# Patient Record
Sex: FEMALE | Race: WHITE | Employment: OTHER | URBAN - METROPOLITAN AREA
[De-identification: names, ages, dates, MRNs, and addresses within clinical notes are randomized per-mention and may not be internally consistent; named-entity substitution may affect disease eponyms.]

---

## 2017-02-16 ENCOUNTER — GENERIC CONVERSION - ENCOUNTER (OUTPATIENT)
Dept: OTHER | Facility: OTHER | Age: 77
End: 2017-02-16

## 2017-10-12 ENCOUNTER — ALLSCRIPTS OFFICE VISIT (OUTPATIENT)
Dept: OTHER | Facility: OTHER | Age: 77
End: 2017-10-12

## 2017-10-12 DIAGNOSIS — N32.81 OVERACTIVE BLADDER: ICD-10-CM

## 2017-10-25 ENCOUNTER — GENERIC CONVERSION - ENCOUNTER (OUTPATIENT)
Dept: OTHER | Facility: OTHER | Age: 77
End: 2017-10-25

## 2017-10-26 ENCOUNTER — ALLSCRIPTS OFFICE VISIT (OUTPATIENT)
Dept: OTHER | Facility: OTHER | Age: 77
End: 2017-10-26

## 2017-10-26 LAB
BILIRUB UR QL STRIP: NEGATIVE
CLARITY UR: NORMAL
COLOR UR: YELLOW
GLUCOSE (HISTORICAL): NEGATIVE
HGB UR QL STRIP.AUTO: NEGATIVE
KETONES UR STRIP-MCNC: NEGATIVE MG/DL
LEUKOCYTE ESTERASE UR QL STRIP: NEGATIVE
NITRITE UR QL STRIP: NEGATIVE
PH UR STRIP.AUTO: 6 [PH]
PROT UR STRIP-MCNC: NEGATIVE MG/DL
SP GR UR STRIP.AUTO: 1.01
UROBILINOGEN UR QL STRIP.AUTO: 0.2

## 2018-01-09 NOTE — MISCELLANEOUS
Message   Recorded as Task   Date: 07/14/2016 01:14 PM, Created By: Venancio Chavez   Task Name: Follow Up   Assigned To: Dasha Gomes   Regarding Patient: Da Sutton, Status: Active   Comment:    Dasha Gomes - 14 Jul 2016 1:14 PM     TASK CREATED  pt said that she got the vinegar douche    she wants to know how often she can do that? I thought that it was just now and that was it? 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 3) (N95 2)   6  Vaginal odor (625 8) (N89 8)   7  Visit for screening mammogram (V76 12) (Z12 31)    Current Meds   1  Citalopram Hydrobromide 20 MG Oral Tablet; Therapy: 18ARV4823 to (Last Rx:43Jty2123)  Requested for: 37Nqw4726 Ordered   2  ClonazePAM 0 5 MG Oral Tablet; Therapy: 96LKY2282 to (Last Rx:38Kya0846)  Requested for: 61Chc9627 Ordered   3  Nasacort AQ 55 MCG/ACT AERS; Therapy: 38Ewm7853 to (Last Danetta Offer)  Requested for: 49Aey2317 Ordered   4  NexIUM PACK; Therapy: (Recorded:27Jun2014) to Recorded   5  Synthroid 125 MCG Oral Tablet (Levothyroxine Sodium); Therapy: (Recorded:27Jun2014) to Recorded    Allergies    1  Easprin TBEC   2   Penicillins    Signatures   Electronically signed by : Jean eFrreira, ; Jul 14 2016  1:34PM EST                       (Author)

## 2018-01-12 VITALS
WEIGHT: 177.25 LBS | SYSTOLIC BLOOD PRESSURE: 120 MMHG | BODY MASS INDEX: 29.53 KG/M2 | DIASTOLIC BLOOD PRESSURE: 62 MMHG | HEIGHT: 65 IN

## 2018-01-15 NOTE — MISCELLANEOUS
Message   Date: 26 Jan 2016 10:40 AM EST, Recorded By: Ginna Smith   Calling For: Yamila Mchugh: Fred Sebastian, Self   Phone: [phone] (Home)   Reason: Other   Patient called for screening mammo slip  States did not have Dx reagan and u/s done in July 2015 as ordered by Dr Neida Ruth  States that problem has since cleared up  Now wants annual screening  Slip mailed per request         Active Problems    1  Breast disorder (611 9) (N64 9)   2  Encounter for routine gynecological examination (V72 31) (Z01 419)   3  Denied: History of self breast exam   4  Hypothyroidism (244 9) (E03 9)   5  Postmenopausal atrophic vaginitis (627 3) (N95 2)   6  Visit for screening mammogram (V76 12) (Z12 31)    Current Meds   1  Citalopram Hydrobromide 20 MG Oral Tablet; Therapy: 93PCH4766 to (Last Rx:17Gzm5795)  Requested for: 82Tpc0080 Ordered   2  ClonazePAM 0 5 MG Oral Tablet; Therapy: 45XML4983 to (Last Rx:05Dze9684)  Requested for: 17Vsr6126 Ordered   3  Nasacort AQ 55 MCG/ACT AERS; Therapy: 63Zyt2737 to (Last Susi Lass)  Requested for: 54Ywp5550 Ordered   4  NexIUM PACK; Therapy: (Recorded:27Jun2014) to Recorded   5  Synthroid 125 MCG Oral Tablet (Levothyroxine Sodium); Therapy: (Recorded:27Jun2014) to Recorded    Allergies    1  Easprin TBEC   2  Penicillins    Plan  Visit for screening mammogram    · * MAMMO SCREENING BILATERAL W CAD; Status:Hold For - Scheduling,Retrospective  Authorization;  Requested [id number];     Signatures   Electronically signed by : Chantelle Shen, ; Jan 26 2016 10:42AM EST                       (Author)

## 2018-01-16 NOTE — MISCELLANEOUS
Message   Recorded as Task   Date: 10/25/2017 09:00 AM, Created By: George Mathur   Task Name: Medical Complaint Callback   Assigned To: Freddy CRAWFORD,TEAM   Regarding Patient: Kristin Perez, Status: Active   CommentKeaton Smallwood - 25 Oct 2017 9:00 AM     TASK CREATED  Caller: Self; [phone] (Home)  Pt seen 10/22/17 Patient [facility]  kidney pain calling for appointment  Mandy Maria - 25 Oct 2017 9:15 AM     TASK EDITED  PT  STATED SHE GOTTEN IT TAKEN CARE OF ALREADY  Active Problems    1  Breast disorder (611 9) (N64 9)   2  Encounter for routine gynecological examination (V72 31) (Z01 419)   3  History of self breast exam   4  Hypothyroidism (244 9) (E03 9)   5  Overactive bladder (596 51) (N32 81)   6  Postmenopausal atrophic vaginitis (627 3) (N95 2)   7  Vaginal odor (625 8) (N89 8)   8  Visit for screening mammogram (V76 12) (Z12 31)    Current Meds   1  Citalopram Hydrobromide 20 MG Oral Tablet; Therapy: 57SCX6045 to (Last Rx:39Npl5690)  Requested for: 39Efo0229 Ordered   2  Nasacort AQ 55 MCG/ACT AERS; Therapy: 68Fgv1033 to (Last Nile Germán)  Requested for: 54Xzk8260 Ordered   3  NexIUM PACK; Therapy: (Recorded:27Jun2014) to Recorded   4  Synthroid 125 MCG Oral Tablet (Levothyroxine Sodium); Therapy: (Recorded:27Jun2014) to Recorded   5  VESIcare 5 MG Oral Tablet; TAKE 1 TABLET DAILY; Therapy: 68NTH1150 to (Evaluate:27Zlx7894)  Requested for: 37FBE4069; Last   Rx:12Oct2017 Ordered    Allergies    1  Easprin TBEC   2   Penicillins    Signatures   Electronically signed by : Enrrique Wells, ; Oct 25 2017  9:15AM EST                       (Author)

## 2018-01-17 NOTE — MISCELLANEOUS
Message   Recorded as Task   Date: 10/25/2017 11:35 AM, Created By: Wilmer Looney   Task Name: Medical Complaint Callback   Assigned To: [place] [facility] [place] [id number],TEAM   Regarding Patient: Kelsie Sutton, Status: Active   Comment:    Arlene Roe - 25 Oct 2017 11:35 AM     TASK CREATED  Caller: Self; Medical Complaint; [phone] (Home)  PATIENT CALLED SAYING SHE WAS WAITING FOR A NURSE TO CALL HER BACK ONCE SHE RECIEVED HER PAPERS FROM THE [facility] ON [address]  PLEASE CALL HER BACK AT HOME #   Rand Oliva - 25 Oct 2017 11:48 AM     TASK EDITED  SPOKE TO PT, SEEN AT URGENT CARE, CULTURE DONE AND PLACED ON ANTIBIOTIC, RECEIVED CALL TODAY CULTURE "OKAY" TO STOP ANTIBIOTIC  STILL HAVING RIGHT BACK PAIN AND URINE CLOUDY  WILL OBTAIN CULTURE FROM PT FIRST  APPT 10/26 WITH ANTONIA HENNING PA-C  Active Problems    1  Breast disorder (611 9) (N64 9)   2  Encounter for routine gynecological examination (V72 31) (Z01 419)   3  History of self breast exam   4  Hypothyroidism (244 9) (E03 9)   5  Overactive bladder (596 51) (N32 81)   6  Postmenopausal atrophic vaginitis (627 3) (N95 2)   7  Vaginal odor (625 8) (N89 8)   8  Visit for screening mammogram (V76 12) (Z12 31)    Current Meds   1  Citalopram Hydrobromide 20 MG Oral Tablet; Therapy: 21RSV2746 to (Last Rx:61Cbi5170)  Requested for: 64Eyf8462 Ordered   2  Nasacort AQ 55 MCG/ACT AERS; Therapy: 69Qaa9414 to (Last [place])  Requested for: 50Tka3261 Ordered   3  NexIUM PACK; Therapy: (Recorded:27Jun2014) to Recorded   4  Synthroid 125 MCG Oral Tablet (Levothyroxine Sodium); Therapy: (Recorded:27Jun2014) to Recorded   5  VESIcare 5 MG Oral Tablet; TAKE 1 TABLET DAILY; Therapy: 41IGT2207 to (Evaluate:10Jan2018)  Requested for: 27FBV1219; Last   Rx:12Oct2017 Ordered    Allergies    1  Easprin TBEC   2   Penicillins    Signatures   Electronically signed by : Vivian Fry RN; Oct 25 2017 11:48AM EST                       (Author)

## 2018-01-22 VITALS
DIASTOLIC BLOOD PRESSURE: 76 MMHG | SYSTOLIC BLOOD PRESSURE: 148 MMHG | WEIGHT: 174 LBS | BODY MASS INDEX: 27.31 KG/M2 | HEIGHT: 67 IN

## 2018-08-02 ENCOUNTER — TELEPHONE (OUTPATIENT)
Dept: OBGYN CLINIC | Facility: CLINIC | Age: 78
End: 2018-08-02

## 2018-08-02 DIAGNOSIS — Z12.31 SCREENING MAMMOGRAM, ENCOUNTER FOR: Primary | ICD-10-CM

## 2019-07-19 ENCOUNTER — TELEPHONE (OUTPATIENT)
Dept: OBGYN CLINIC | Facility: CLINIC | Age: 79
End: 2019-07-19

## 2019-07-19 NOTE — TELEPHONE ENCOUNTER
Patient called c/o spot on right breast seems darker than it was previously    Advised schedule appointment for breast exam

## 2019-07-23 ENCOUNTER — OFFICE VISIT (OUTPATIENT)
Dept: OBGYN CLINIC | Facility: CLINIC | Age: 79
End: 2019-07-23
Payer: MEDICARE

## 2019-07-23 VITALS
HEIGHT: 66 IN | BODY MASS INDEX: 26.2 KG/M2 | WEIGHT: 163 LBS | DIASTOLIC BLOOD PRESSURE: 70 MMHG | SYSTOLIC BLOOD PRESSURE: 128 MMHG

## 2019-07-23 DIAGNOSIS — L70.0 BLACKHEAD: Primary | ICD-10-CM

## 2019-07-23 PROCEDURE — 99213 OFFICE O/P EST LOW 20 MIN: CPT | Performed by: OBSTETRICS & GYNECOLOGY

## 2019-07-23 RX ORDER — CITALOPRAM 10 MG/1
10 TABLET ORAL DAILY
COMMUNITY
End: 2020-07-10

## 2019-07-23 RX ORDER — LEVOFLOXACIN 750 MG/1
TABLET ORAL
COMMUNITY
End: 2020-07-10

## 2019-07-23 RX ORDER — LEVOTHYROXINE SODIUM 175 UG/1
TABLET ORAL
COMMUNITY
End: 2020-07-10

## 2019-07-23 RX ORDER — METHOCARBAMOL 500 MG/1
TABLET, FILM COATED ORAL
COMMUNITY
End: 2020-07-10

## 2019-07-23 RX ORDER — CYCLOBENZAPRINE HCL 5 MG
TABLET ORAL
COMMUNITY
End: 2020-07-10

## 2019-07-23 RX ORDER — LEVOTHYROXINE SODIUM 0.15 MG/1
150 TABLET ORAL DAILY
Refills: 1 | COMMUNITY
Start: 2019-07-17 | End: 2020-07-10

## 2019-07-23 RX ORDER — MECLIZINE HYDROCHLORIDE 25 MG/1
TABLET ORAL
COMMUNITY
End: 2020-07-10

## 2019-07-23 RX ORDER — LORAZEPAM 0.5 MG/1
TABLET ORAL
COMMUNITY
End: 2020-07-10

## 2019-07-23 RX ORDER — ESCITALOPRAM OXALATE 20 MG/1
TABLET ORAL DAILY
COMMUNITY
End: 2020-07-10

## 2019-07-23 RX ORDER — BUTALBITAL, ACETAMINOPHEN AND CAFFEINE 300; 40; 50 MG/1; MG/1; MG/1
CAPSULE ORAL
COMMUNITY

## 2019-07-23 RX ORDER — CHLORHEXIDINE GLUCONATE 4 G/100ML
SOLUTION TOPICAL
COMMUNITY
End: 2020-07-10

## 2019-07-23 RX ORDER — MOXIFLOXACIN HYDROCHLORIDE 400 MG/1
TABLET ORAL
COMMUNITY
End: 2020-07-10

## 2019-07-23 RX ORDER — TRAMADOL HYDROCHLORIDE 100 MG/1
TABLET, EXTENDED RELEASE ORAL
COMMUNITY
End: 2020-07-10

## 2019-07-23 RX ORDER — DOXYCYCLINE HYCLATE 100 MG/1
CAPSULE ORAL
COMMUNITY
End: 2020-07-10

## 2019-07-23 RX ORDER — FAMCICLOVIR 500 MG/1
TABLET, FILM COATED ORAL
COMMUNITY
End: 2020-07-10

## 2019-07-23 RX ORDER — COLCHICINE 0.6 MG/1
TABLET ORAL
COMMUNITY
End: 2020-07-10

## 2019-07-23 RX ORDER — MELOXICAM 7.5 MG/1
TABLET ORAL
COMMUNITY
End: 2020-07-10

## 2019-07-23 RX ORDER — CYCLOSPORINE 0.5 MG/ML
EMULSION OPHTHALMIC
COMMUNITY
End: 2020-07-10

## 2019-07-23 RX ORDER — METHOCARBAMOL 750 MG/1
TABLET, FILM COATED ORAL
COMMUNITY
End: 2020-07-10

## 2019-07-23 RX ORDER — CELECOXIB 200 MG/1
CAPSULE ORAL
COMMUNITY
End: 2020-07-10

## 2019-07-23 RX ORDER — DIFLUPREDNATE 0.5 MG/ML
EMULSION OPHTHALMIC
COMMUNITY
End: 2020-07-10

## 2019-07-23 RX ORDER — LEVOTHYROXINE SODIUM 20 UG/ML
INJECTION, SOLUTION INTRAVENOUS
COMMUNITY
End: 2020-07-10

## 2019-07-23 RX ORDER — ERYTHROMYCIN 5 MG/G
OINTMENT OPHTHALMIC
COMMUNITY
End: 2020-07-10

## 2019-07-23 RX ORDER — PANTOPRAZOLE SODIUM 20 MG/1
TABLET, DELAYED RELEASE ORAL
COMMUNITY
End: 2020-07-10

## 2019-07-23 RX ORDER — PEG-3350, SODIUM SULFATE, SODIUM CHLORIDE, POTASSIUM CHLORIDE, SODIUM ASCORBATE AND ASCORBIC ACID 7.5-2.691G
KIT ORAL
COMMUNITY
End: 2020-07-10

## 2019-07-23 RX ORDER — GUAIFENESIN AND CODEINE PHOSPHATE 100; 10 MG/5ML; MG/5ML
SOLUTION ORAL
COMMUNITY
End: 2020-07-10

## 2019-07-23 RX ORDER — MOXIFLOXACIN 5 MG/ML
SOLUTION/ DROPS OPHTHALMIC
COMMUNITY
End: 2020-07-10

## 2019-07-23 RX ORDER — OXYBUTYNIN CHLORIDE 15 MG/1
TABLET, EXTENDED RELEASE ORAL
COMMUNITY
End: 2020-07-10

## 2019-07-23 RX ORDER — CLONAZEPAM 0.5 MG/1
TABLET ORAL
COMMUNITY
End: 2020-07-10

## 2019-07-23 RX ORDER — HYDROCODONE BITARTRATE AND ACETAMINOPHEN 5; 325 MG/1; MG/1
TABLET ORAL
COMMUNITY
End: 2020-07-10

## 2019-07-23 RX ORDER — NABUMETONE 750 MG/1
TABLET, FILM COATED ORAL
COMMUNITY
End: 2020-07-10

## 2019-07-23 RX ORDER — METRONIDAZOLE 500 MG/1
1 TABLET ORAL 2 TIMES DAILY
COMMUNITY
Start: 2017-10-26 | End: 2020-07-10

## 2019-07-23 RX ORDER — LEVOTHYROXINE SODIUM 137 UG/1
137 TABLET ORAL DAILY
COMMUNITY

## 2019-07-23 RX ORDER — CEFUROXIME AXETIL 250 MG/1
TABLET ORAL
COMMUNITY
End: 2020-07-10

## 2019-07-23 RX ORDER — OXYCODONE HYDROCHLORIDE AND ACETAMINOPHEN 5; 325 MG/1; MG/1
TABLET ORAL
COMMUNITY
End: 2020-07-10

## 2019-07-23 RX ORDER — POLYMYXIN B SULFATE AND TRIMETHOPRIM 1; 10000 MG/ML; [USP'U]/ML
SOLUTION OPHTHALMIC
COMMUNITY
End: 2020-07-10

## 2019-07-23 RX ORDER — SULFAMETHOXAZOLE AND TRIMETHOPRIM 800; 160 MG/1; MG/1
TABLET ORAL
COMMUNITY
End: 2020-07-10

## 2019-07-23 RX ORDER — PANTOPRAZOLE SODIUM 40 MG/1
TABLET, DELAYED RELEASE ORAL
COMMUNITY
End: 2020-07-10

## 2019-07-23 RX ORDER — ESOMEPRAZOLE MAGNESIUM 40 MG/1
CAPSULE, DELAYED RELEASE ORAL
COMMUNITY
End: 2020-07-10

## 2019-07-23 RX ORDER — LEVOTHYROXINE SODIUM 0.12 MG/1
TABLET ORAL
COMMUNITY
End: 2020-07-10

## 2019-07-23 RX ORDER — CHLORAL HYDRATE 500 MG
1000 CAPSULE ORAL DAILY
COMMUNITY

## 2019-07-23 RX ORDER — NITROFURANTOIN MACROCRYSTALS 50 MG/1
CAPSULE ORAL
COMMUNITY
End: 2020-07-10

## 2019-07-23 NOTE — PROGRESS NOTES
Assessment/Plan:   Benign skin lesion, patient reassured, to be seen for annual in October  There are no diagnoses linked to this encounter  Subjective:     Patient ID: Holly Bazzi is a 70 y o  female  Renny Ahmadi is here with concerns of a skin lesion of the right breast   She felt it was brown colored when she had her mammogram and since then it seems to have turned black  She denies any lumps, other skin changes, pains or other breast problems  Review of Systems    Noncontributory    Objective:     Physical Exam   Symmetric bilaterally without axillary adenopathy or supraclavicular adenopathy  The only skin area of concern is a small blackhead measuring no more than 4 mm, easily within the skin, palpable and with no other obvious masses

## 2019-10-21 ENCOUNTER — ANNUAL EXAM (OUTPATIENT)
Dept: OBGYN CLINIC | Facility: CLINIC | Age: 79
End: 2019-10-21
Payer: MEDICARE

## 2019-10-21 VITALS — SYSTOLIC BLOOD PRESSURE: 124 MMHG | WEIGHT: 167 LBS | DIASTOLIC BLOOD PRESSURE: 70 MMHG | BODY MASS INDEX: 26.95 KG/M2

## 2019-10-21 DIAGNOSIS — Z12.31 ENCOUNTER FOR SCREENING MAMMOGRAM FOR MALIGNANT NEOPLASM OF BREAST: ICD-10-CM

## 2019-10-21 DIAGNOSIS — N36.41 URETHRAL HYPERMOBILITY: ICD-10-CM

## 2019-10-21 DIAGNOSIS — Z01.411 ENCOUNTER FOR GYNECOLOGICAL EXAMINATION WITH ABNORMAL FINDING: Primary | ICD-10-CM

## 2019-10-21 PROCEDURE — G0101 CA SCREEN;PELVIC/BREAST EXAM: HCPCS | Performed by: OBSTETRICS & GYNECOLOGY

## 2019-10-21 RX ORDER — TRAZODONE HYDROCHLORIDE 50 MG/1
TABLET ORAL
Refills: 0 | COMMUNITY
Start: 2019-09-18

## 2019-10-21 NOTE — PROGRESS NOTES
CC:  Annual exam    HPI: Tsering Rentreia presents for routine gyn exam   Twilla Homans is doing well with only the issues of leakage with coughing and sneezing  Past Medical History:  Past Medical History:   Diagnosis Date    Disease of thyroid gland        Past Surgical History:  No past surgical history on file  Past OB/Gyn History:    Patient is menopausal without symptoms  She is status post hysterectomy  Denies any history of sexually transmitted infection  No history of abnormal pap smears       ALLERGIES:   Allergies   Allergen Reactions    Aspirin Other (See Comments)     bleeding      Antihistamines, Loratadine-Type Other (See Comments)     insomnia    Sulfa Antibiotics Nausea Only, Other (See Comments) and Vomiting     headache Tstomach upset      Penicillins Other (See Comments) and Rash     rash         MEDS:   Current Outpatient Medications:     Butalbital-APAP-Caffeine -40 MG CAPS    Calcium Carb-Cholecalciferol (CALCIUM 1000 + D PO)    Calcium Carb-Cholecalciferol (CALCIUM 1000 + D) 1000-800 MG-UNIT TABS    cefuroxime (CEFTIN) 250 mg tablet    celecoxib (CELEBREX) 200 mg capsule    chlorhexidine (HIBICLENS) 4 % external liquid    citalopram (CeleXA) 10 mg tablet    clonazePAM (KlonoPIN) 0 5 mg tablet    colchicine (COLCRYS) 0 6 mg tablet    conjugated estrogens (PREMARIN) vaginal cream    cyclobenzaprine (FLEXERIL) 5 mg tablet    cycloSPORINE (RESTASIS) 0 05 % ophthalmic emulsion    diclofenac sodium (VOLTAREN) 1 %    Difluprednate (DUREZOL) 0 05 % EMUL    doxycycline hyclate (VIBRAMYCIN) 100 mg capsule    erythromycin (ILOTYCIN) ophthalmic ointment    escitalopram (LEXAPRO) 20 mg tablet    esomeprazole (NEXIUM) 40 MG capsule    esomeprazole (NEXIUM) 5 MG packet    famciclovir (FAMVIR) 500 mg tablet    guaifenesin-codeine (CHERATUSSIN AC) 100-10 MG/5ML liquid    HYDROcodone-acetaminophen (NORCO) 5-325 mg per tablet    hydrocortisone (ANUSOL-HC, PROCTOSOL HC,) 2 5 % rectal cream    levofloxacin (LEVAQUIN) 750 mg tablet    levothyroxine (SYNTHROID) 137 mcg tablet    levothyroxine 125 mcg tablet    levothyroxine 150 mcg tablet    levothyroxine 175 mcg tablet    Levothyroxine Sodium 100 MCG/5ML SOLN    lifitegrast (XIIDRA) 5 % op solution    LORazepam (ATIVAN) 0 5 mg tablet    meclizine (ANTIVERT) 25 mg tablet    meloxicam (MOBIC) 7 5 mg tablet    methocarbamol (ROBAXIN) 500 mg tablet    methocarbamol (ROBAXIN) 750 mg tablet    metroNIDAZOLE (FLAGYL) 500 mg tablet    moxifloxacin (AVELOX) 400 MG tablet    moxifloxacin (VIGAMOX) 0 5 % ophthalmic solution    nabumetone (RELAFEN) 750 mg tablet    Nepafenac (ILEVRO) 0 3 % SUSP    nitrofurantoin (MACRODANTIN) 50 mg capsule    Omega-3 Fatty Acids (FISH OIL) 1,000 mg    Omega-3 Fatty Acids (FISH OIL) 645 MG CAPS    oxaprozin (DAYPRO) 600 MG tablet    oxybutynin (DITROPAN XL) 15 MG 24 hr tablet    oxyCODONE-acetaminophen (PERCOCET) 5-325 mg per tablet    pantoprazole (PROTONIX) 20 mg tablet    pantoprazole (PROTONIX) 40 mg tablet    PEG 3350-KCl-NaCl-NaSulf-Na Asc-C (MOVIPREP) 100 g    polymyxin b-trimethoprim (POLYTRIM) ophthalmic solution    sulfamethoxazole-trimethoprim (BACTRIM DS) 800-160 mg per tablet    traMADol (ULTRAM-ER) 100 mg 24 hr tablet    traZODone (DESYREL) 50 mg tablet    Family History:  Family History   Problem Relation Age of Onset    Breast cancer Sister        Social History:  Social History     Socioeconomic History    Marital status: /Civil Union     Spouse name: Not on file    Number of children: Not on file    Years of education: Not on file    Highest education level: Not on file   Occupational History    Not on file   Social Needs    Financial resource strain: Not on file    Food insecurity:     Worry: Not on file     Inability: Not on file    Transportation needs:     Medical: Not on file     Non-medical: Not on file   Tobacco Use    Smoking status: Never Smoker  Smokeless tobacco: Never Used   Substance and Sexual Activity    Alcohol use: Yes    Drug use: Never    Sexual activity: Not Currently   Lifestyle    Physical activity:     Days per week: Not on file     Minutes per session: Not on file    Stress: Not on file   Relationships    Social connections:     Talks on phone: Not on file     Gets together: Not on file     Attends [employer] service: Not on file     Active member of club or organization: Not on file     Attends meetings of clubs or organizations: Not on file     Relationship status: Not on file    Intimate partner violence:     Fear of current or ex partner: Not on file     Emotionally abused: Not on file     Physically abused: Not on file     Forced sexual activity: Not on file   Other Topics Concern    Not on file   Social History Narrative    Not on file         Review of Systems:  Gen:   Denies fatigue, chills, nausea, vomiting, fever  Skin: No rashes or discolorations of any concern  RESP: Denies SOB, no cough  CV: Denies chest pain or palpitations  Breasts: Denies masses, pain, skin changes and nipple discharge  GI: Denies abdominal pain, heartburn, nausea, vomiting, changes in bowel habits  : Denies dysuria, frequency, CVA tenderness,and hematuria  Positive for stress urinary incontinence  Genitalia: Denies abnormal vaginal discharge, external lesions, rashes, pelvic pain, pressure, abnormal bleeding  Rectal:  Denies pain, bleeding, hemorrhoids,    Physical Exam:  /70 (BP Location: Left arm, Patient Position: Sitting, Cuff Size: Standard)   Wt 75 8 kg (167 lb)   BMI 26 95 kg/m²    Gen: The patient was alert and oriented x3, pleasant well-appearing female in no acute distress  Neck:  Unremarkable, no lymphadenopathy, no thyromegaly, or tenderness  CV:  RRR, no murmurs  Resp:  Clear to auscultation bilaterally, no wheezing  Breasts: Symmetric  No dominant, discrete, fixed  or suspicious masses are noted     No skin or nipple changes  No palpable axillary nodes  No supraclavicular adenopathy  Abd:  Soft, nontender, nondistended, no masses or organomegaly  Back:  No CVA tenderness, no tenderness to palpation along spine  Pelvic  Normal atrophic appearing external female genitalia, no visible lesions, no rashes  Vagina is free of discharge, normal vaginal epithelium, no abnormal  lesions, no evidence of prolapse anteriorly or posteriorly  There is significant deflection of the UV angle  Cervix and uterus are surgically absent  No palpable adnexal masses or tenderness  No anoperineal lesions  Rectal:  No masses, tenderness, hemorrhoids, or obvious blood  Skin:  No concerning lesions  Extremeties: No edema      Assessment & Plan:   1  Routine annual exam      RTO  two years orPRN  2  Encounter for screening mammogram, referral for mammogram given to patient  3   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

## 2020-06-15 ENCOUNTER — TELEPHONE (OUTPATIENT)
Dept: OBGYN CLINIC | Facility: CLINIC | Age: 80
End: 2020-06-15

## 2020-06-17 ENCOUNTER — PREP FOR PROCEDURE (OUTPATIENT)
Dept: OBGYN CLINIC | Facility: CLINIC | Age: 80
End: 2020-06-17

## 2020-06-17 ENCOUNTER — PROCEDURE VISIT (OUTPATIENT)
Dept: OBGYN CLINIC | Facility: CLINIC | Age: 80
End: 2020-06-17
Payer: MEDICARE

## 2020-06-17 ENCOUNTER — ULTRASOUND (OUTPATIENT)
Dept: OBGYN CLINIC | Facility: CLINIC | Age: 80
End: 2020-06-17
Payer: MEDICARE

## 2020-06-17 VITALS
BODY MASS INDEX: 25.88 KG/M2 | WEIGHT: 161 LBS | DIASTOLIC BLOOD PRESSURE: 70 MMHG | HEIGHT: 66 IN | SYSTOLIC BLOOD PRESSURE: 130 MMHG

## 2020-06-17 DIAGNOSIS — R93.89 ENDOMETRIAL THICKENING ON ULTRASOUND: ICD-10-CM

## 2020-06-17 DIAGNOSIS — Z01.818 PRE-OP TESTING: Primary | ICD-10-CM

## 2020-06-17 DIAGNOSIS — N88.2 CERVICAL STENOSIS (UTERINE CERVIX): ICD-10-CM

## 2020-06-17 DIAGNOSIS — N95.0 PMB (POSTMENOPAUSAL BLEEDING): Primary | ICD-10-CM

## 2020-06-17 DIAGNOSIS — Z11.59 SCREENING FOR VIRAL DISEASE: ICD-10-CM

## 2020-06-17 DIAGNOSIS — Z12.4 SCREENING FOR CERVICAL CANCER: ICD-10-CM

## 2020-06-17 DIAGNOSIS — R93.89 ENDOMETRIAL THICKENING ON ULTRASOUND: Primary | ICD-10-CM

## 2020-06-17 PROCEDURE — 87624 HPV HI-RISK TYP POOLED RSLT: CPT | Performed by: OBSTETRICS & GYNECOLOGY

## 2020-06-17 PROCEDURE — G0145 SCR C/V CYTO,THINLAYER,RESCR: HCPCS | Performed by: OBSTETRICS & GYNECOLOGY

## 2020-06-17 PROCEDURE — 99214 OFFICE O/P EST MOD 30 MIN: CPT | Performed by: OBSTETRICS & GYNECOLOGY

## 2020-06-17 PROCEDURE — 76830 TRANSVAGINAL US NON-OB: CPT

## 2020-06-17 RX ORDER — LEVOTHYROXINE SODIUM 137 UG/1
TABLET ORAL DAILY
COMMUNITY
End: 2020-07-10

## 2020-06-17 RX ORDER — CITALOPRAM 10 MG/1
10 TABLET ORAL DAILY
COMMUNITY

## 2020-06-17 RX ORDER — CEPHALEXIN 500 MG/1
500 CAPSULE ORAL 2 TIMES DAILY
COMMUNITY
Start: 2020-06-01 | End: 2020-07-10

## 2020-06-17 RX ORDER — TIZANIDINE 2 MG/1
TABLET ORAL
COMMUNITY
End: 2020-07-10

## 2020-06-17 RX ORDER — MISOPROSTOL 200 UG/1
TABLET ORAL
Qty: 2 TABLET | Refills: 0 | Status: SHIPPED | OUTPATIENT
Start: 2020-06-17 | End: 2020-07-15 | Stop reason: HOSPADM

## 2020-06-17 RX ORDER — FAMOTIDINE 20 MG/1
20 TABLET, FILM COATED ORAL 2 TIMES DAILY
COMMUNITY
Start: 2020-06-10

## 2020-06-17 RX ORDER — CITALOPRAM 20 MG/1
10 TABLET ORAL DAILY
COMMUNITY
Start: 2020-04-27 | End: 2020-07-10

## 2020-06-17 RX ORDER — HYDROCORTISONE 25 MG/G
CREAM TOPICAL
COMMUNITY
End: 2020-07-10

## 2020-06-17 NOTE — H&P (VIEW-ONLY)
CC:    Brown tinged vaginal discharge    HPI: Helio Parker presents for evaluation of recent onset of brown tinged vaginal discharge which is spontaneous and free of any pelvic pain  The patient who has been seen previously has not had this issue  She did undergo an ultrasound that did show endometrial thickening, possible uterine polyp  She also did have a Pap smear performed  She has a very stenotic cervix and thus an a office endometrial biopsy could not be performed  In reviewing the findings it was recommended she undergo a hysteroscopy, D and C and possible polypectomy in the hospital   She was informed of the cervical stenosis and was given a prescription for Misoprostol to be use prior to the procedure which she does wish to undergo  The patient was personally consented by myself  Past Medical History:  Past Medical History:   Diagnosis Date    Disease of thyroid gland        Past Surgical History:  No past surgical history on file  Past OB/Gyn History:    Patient is menopausal   Denies any history of sexually transmitted infection  No history of abnormal pap smears  Her last pap smear was taken today      ALLERGIES:   Allergies   Allergen Reactions    Aspirin Other (See Comments)     bleeding      Antihistamines, Loratadine-Type Other (See Comments)     insomnia    Sulfa Antibiotics Nausea Only, Other (See Comments) and Vomiting     headache Tstomach upset      Penicillins Other (See Comments) and Rash     rash         MEDS:   Current Outpatient Medications:     Butalbital-APAP-Caffeine -40 MG CAPS    Calcium Carb-Cholecalciferol (CALCIUM 1000 + D PO)    cefuroxime (CEFTIN) 250 mg tablet    celecoxib (CELEBREX) 200 mg capsule    cephalexin (KEFLEX) 500 mg capsule    chlorhexidine (HIBICLENS) 4 % external liquid    citalopram (CeleXA) 10 mg tablet    citalopram (CeleXA) 20 mg tablet    clonazePAM (KlonoPIN) 0 5 mg tablet    colchicine (COLCRYS) 0 6 mg tablet   conjugated estrogens (PREMARIN) vaginal cream    cyclobenzaprine (FLEXERIL) 5 mg tablet    cycloSPORINE (RESTASIS) 0 05 % ophthalmic emulsion    diclofenac sodium (VOLTAREN) 1 %    Difluprednate (DUREZOL) 0 05 % EMUL    doxycycline hyclate (VIBRAMYCIN) 100 mg capsule    erythromycin (ILOTYCIN) ophthalmic ointment    escitalopram (LEXAPRO) 20 mg tablet    esomeprazole (NEXIUM) 40 MG capsule    esomeprazole (NEXIUM) 5 MG packet    famciclovir (FAMVIR) 500 mg tablet    famotidine (PEPCID) 20 mg tablet    guaifenesin-codeine (CHERATUSSIN AC) 100-10 MG/5ML liquid    HYDROcodone-acetaminophen (NORCO) 5-325 mg per tablet    hydrocortisone (ANUSOL-HC) 2 5 % rectal cream    hydrocortisone (ANUSOL-HC, PROCTOSOL HC,) 2 5 % rectal cream    levofloxacin (LEVAQUIN) 750 mg tablet    levothyroxine (SYNTHROID) 137 mcg tablet    levothyroxine 125 mcg tablet    levothyroxine 137 mcg tablet    levothyroxine 150 mcg tablet    levothyroxine 175 mcg tablet    Levothyroxine Sodium 100 MCG/5ML SOLN    lifitegrast (XIIDRA) 5 % op solution    LORazepam (ATIVAN) 0 5 mg tablet    meclizine (ANTIVERT) 25 mg tablet    meloxicam (MOBIC) 7 5 mg tablet    methocarbamol (ROBAXIN) 500 mg tablet    methocarbamol (ROBAXIN) 750 mg tablet    metroNIDAZOLE (FLAGYL) 500 mg tablet    moxifloxacin (AVELOX) 400 MG tablet    moxifloxacin (VIGAMOX) 0 5 % ophthalmic solution    nabumetone (RELAFEN) 750 mg tablet    Nepafenac (ILEVRO) 0 3 % SUSP    nitrofurantoin (MACRODANTIN) 50 mg capsule    Omega-3 Fatty Acids (FISH OIL) 1,000 mg    Omega-3 Fatty Acids (FISH OIL) 645 MG CAPS    oxaprozin (DAYPRO) 600 MG tablet    oxybutynin (DITROPAN XL) 15 MG 24 hr tablet    oxyCODONE-acetaminophen (PERCOCET) 5-325 mg per tablet    pantoprazole (PROTONIX) 20 mg tablet    pantoprazole (PROTONIX) 40 mg tablet    PEG 3350-KCl-NaCl-NaSulf-Na Asc-C (MOVIPREP) 100 g    polymyxin b-trimethoprim (POLYTRIM) ophthalmic solution   sulfamethoxazole-trimethoprim (BACTRIM DS) 800-160 mg per tablet    tiZANidine (ZANAFLEX) 2 mg tablet    traMADol (ULTRAM-ER) 100 mg 24 hr tablet    TRAZODONE & DIET MANAGE PROD PO    traZODone (DESYREL) 50 mg tablet    Calcium Carb-Cholecalciferol (CALCIUM 1000 + D) 1000-800 MG-UNIT TABS    citalopram (CeleXA) 10 mg tablet    misoprostol (CYTOTEC) 200 mcg tablet    Family History:  Family History   Problem Relation Age of Onset    Breast cancer Sister        Social History:  Social History     Socioeconomic History    Marital status: /Civil Union     Spouse name: Not on file    Number of children: Not on file    Years of education: Not on file    Highest education level: Not on file   Occupational History    Not on file   Social Needs    Financial resource strain: Not on file    Food insecurity:     Worry: Not on file     Inability: Not on file    Transportation needs:     Medical: Not on file     Non-medical: Not on file   Tobacco Use    Smoking status: Never Smoker    Smokeless tobacco: Never Used   Substance and Sexual Activity    Alcohol use:  Yes    Drug use: Never    Sexual activity: Not Currently   Lifestyle    Physical activity:     Days per week: Not on file     Minutes per session: Not on file    Stress: Not on file   Relationships    Social connections:     Talks on phone: Not on file     Gets together: Not on file     Attends Protestant service: Not on file     Active member of club or organization: Not on file     Attends meetings of clubs or organizations: Not on file     Relationship status: Not on file    Intimate partner violence:     Fear of current or ex partner: Not on file     Emotionally abused: Not on file     Physically abused: Not on file     Forced sexual activity: Not on file   Other Topics Concern    Not on file   Social History Narrative    Not on file         Review of Systems:  Gen:   Denies fatigue, chills, nausea, vomiting, fever  Skin: No rashes or discolorations of any concern  RESP: Denies SOB, no cough  CV: Denies chest pain or palpitations  Breasts: Denies masses, pain, skin changes and nipple discharge  GI: Denies abdominal pain, heartburn, nausea, vomiting, changes in bowel habits  : Denies dysuria, frequency, CVA tenderness, incontinence and hematuria  Genitalia: Denies abnormal vaginal discharge, external lesions, rashes, pelvic pain, pressure, abnormal bleeding  Rectal:  Denies pain, bleeding, hemorrhoids,    Physical Exam:  /70 (BP Location: Left arm, Patient Position: Sitting, Cuff Size: Standard)   Ht 5' 6" (1 676 m)   Wt 73 kg (161 lb)   BMI 25 99 kg/m²    Gen: The patient was alert and oriented x3, pleasant well-appearing female in no acute distress  Neck:  Unremarkable, no lymphadenopathy, no thyromegaly, or tenderness  CV:  RRR, no murmurs  Resp:  Clear to auscultation bilaterally, no wheezing  Abd:  Soft, nontender, nondistended, no masses or organomegaly  Back:  No CVA tenderness, no tenderness to palpation along spine  Pelvic:  Normal, very atrophic, appearing external female genitalia, no visible lesions, no rashes  Vagina is free of discharge, normal vaginal epithelium, no abnormal  lesions, no evidence of prolapse anteriorly or posteriorly  Atrophic and very stenotic cervix, mobile and nontender  A thin prep pap smear was obtained  Uterus is atrophic in size, mobile and, nontender  No palpable adnexal masses or tenderness  No anoperineal lesions  Rectal:  No masses, tenderness, hemorrhoids, or obvious blood  Skin:  No concerning lesions  Extremeties: No edema      Assessment & Plan:   1  Endometrial thickening, possible endometrial polyp, patient to undergo hysteroscopy, D and C and possible polypectomy  2    Cervical stenosis  Patient given a prescription for Misoprostol to be used preoperatively to hopefully overcome this

## 2020-06-18 LAB
HPV HR 12 DNA CVX QL NAA+PROBE: NEGATIVE
HPV16 DNA CVX QL NAA+PROBE: NEGATIVE
HPV18 DNA CVX QL NAA+PROBE: NEGATIVE

## 2020-06-18 PROCEDURE — 1123F ACP DISCUSS/DSCN MKR DOCD: CPT | Performed by: OBSTETRICS & GYNECOLOGY

## 2020-06-24 ENCOUNTER — TELEPHONE (OUTPATIENT)
Dept: OBGYN CLINIC | Facility: CLINIC | Age: 80
End: 2020-06-24

## 2020-06-24 LAB
LAB AP GYN PRIMARY INTERPRETATION: NORMAL
Lab: NORMAL

## 2020-06-29 ENCOUNTER — APPOINTMENT (OUTPATIENT)
Dept: LAB | Facility: MEDICAL CENTER | Age: 80
End: 2020-06-29
Payer: MEDICARE

## 2020-06-29 ENCOUNTER — CLINICAL SUPPORT (OUTPATIENT)
Dept: URGENT CARE | Facility: MEDICAL CENTER | Age: 80
End: 2020-06-29
Payer: MEDICARE

## 2020-06-29 DIAGNOSIS — Z01.818 PRE-OP TESTING: ICD-10-CM

## 2020-06-29 LAB
ALBUMIN SERPL BCP-MCNC: 4.3 G/DL (ref 3.5–5)
ALP SERPL-CCNC: 65 U/L (ref 46–116)
ALT SERPL W P-5'-P-CCNC: 21 U/L (ref 12–78)
ANION GAP SERPL CALCULATED.3IONS-SCNC: 5 MMOL/L (ref 4–13)
AST SERPL W P-5'-P-CCNC: 15 U/L (ref 5–45)
ATRIAL RATE: 63 BPM
BILIRUB SERPL-MCNC: 0.45 MG/DL (ref 0.2–1)
BUN SERPL-MCNC: 14 MG/DL (ref 5–25)
CALCIUM SERPL-MCNC: 8.4 MG/DL (ref 8.3–10.1)
CHLORIDE SERPL-SCNC: 106 MMOL/L (ref 100–108)
CO2 SERPL-SCNC: 28 MMOL/L (ref 21–32)
CREAT SERPL-MCNC: 0.77 MG/DL (ref 0.6–1.3)
ERYTHROCYTE [DISTWIDTH] IN BLOOD BY AUTOMATED COUNT: 12.9 % (ref 11.6–15.1)
GFR SERPL CREATININE-BSD FRML MDRD: 74 ML/MIN/1.73SQ M
GLUCOSE P FAST SERPL-MCNC: 111 MG/DL (ref 65–99)
HCT VFR BLD AUTO: 39 % (ref 34.8–46.1)
HGB BLD-MCNC: 12.9 G/DL (ref 11.5–15.4)
MCH RBC QN AUTO: 30.9 PG (ref 26.8–34.3)
MCHC RBC AUTO-ENTMCNC: 33.1 G/DL (ref 31.4–37.4)
MCV RBC AUTO: 93 FL (ref 82–98)
P AXIS: 65 DEGREES
PLATELET # BLD AUTO: 235 THOUSANDS/UL (ref 149–390)
PMV BLD AUTO: 10.1 FL (ref 8.9–12.7)
POTASSIUM SERPL-SCNC: 4.2 MMOL/L (ref 3.5–5.3)
PR INTERVAL: 168 MS
PROT SERPL-MCNC: 7.9 G/DL (ref 6.4–8.2)
QRS AXIS: -1 DEGREES
QRSD INTERVAL: 92 MS
QT INTERVAL: 434 MS
QTC INTERVAL: 444 MS
RBC # BLD AUTO: 4.18 MILLION/UL (ref 3.81–5.12)
SODIUM SERPL-SCNC: 139 MMOL/L (ref 136–145)
T WAVE AXIS: 47 DEGREES
VENTRICULAR RATE: 63 BPM
WBC # BLD AUTO: 4.61 THOUSAND/UL (ref 4.31–10.16)

## 2020-06-29 PROCEDURE — 80053 COMPREHEN METABOLIC PANEL: CPT

## 2020-06-29 PROCEDURE — 85027 COMPLETE CBC AUTOMATED: CPT

## 2020-06-29 PROCEDURE — 93005 ELECTROCARDIOGRAM TRACING: CPT

## 2020-06-29 PROCEDURE — 93010 ELECTROCARDIOGRAM REPORT: CPT | Performed by: INTERNAL MEDICINE

## 2020-06-29 PROCEDURE — 36415 COLL VENOUS BLD VENIPUNCTURE: CPT

## 2020-07-09 DIAGNOSIS — Z11.59 SCREENING FOR VIRAL DISEASE: ICD-10-CM

## 2020-07-09 PROCEDURE — U0003 INFECTIOUS AGENT DETECTION BY NUCLEIC ACID (DNA OR RNA); SEVERE ACUTE RESPIRATORY SYNDROME CORONAVIRUS 2 (SARS-COV-2) (CORONAVIRUS DISEASE [COVID-19]), AMPLIFIED PROBE TECHNIQUE, MAKING USE OF HIGH THROUGHPUT TECHNOLOGIES AS DESCRIBED BY CMS-2020-01-R: HCPCS

## 2020-07-10 RX ORDER — MULTIVITAMIN
1 TABLET ORAL DAILY
COMMUNITY

## 2020-07-10 NOTE — PRE-PROCEDURE INSTRUCTIONS
Pre-Surgery Instructions:   Medication Instructions    Butalbital-APAP-Caffeine -40 MG CAPS Instructed patient per Anesthesia Guidelines   Calcium Carb-Cholecalciferol (CALCIUM 600 + D PO) Instructed patient per Anesthesia Guidelines   citalopram (CeleXA) 10 mg tablet Instructed patient per Anesthesia Guidelines   famotidine (PEPCID) 20 mg tablet Instructed patient per Anesthesia Guidelines   levothyroxine (SYNTHROID) 137 mcg tablet Instructed patient per Anesthesia Guidelines   lifitegrast (XIIDRA) 5 % op solution Instructed patient per Anesthesia Guidelines   misoprostol (CYTOTEC) 200 mcg tablet Instructed patient per Anesthesia Guidelines   Multiple Vitamin (MULTIVITAMIN) tablet Instructed patient per Anesthesia Guidelines   traZODone (DESYREL) 50 mg tablet Instructed patient per Anesthesia Guidelines  Patient per anesth guidelines  instructed to take*famotidine, levothyroxine and citalopram*with a sip of water the morning of surgery  Patient given/ instructed on use of chlorhexidine soap per hospital protocol    Patient instructed to stop all ASA, NSAIDS, vitamins and herbal supplements one week prior to surgery or per Dr Livia Cee

## 2020-07-12 LAB — SARS-COV-2 RNA SPEC QL NAA+PROBE: NOT DETECTED

## 2020-07-14 ENCOUNTER — TELEPHONE (OUTPATIENT)
Dept: OBGYN CLINIC | Facility: CLINIC | Age: 80
End: 2020-07-14

## 2020-07-14 ENCOUNTER — ANESTHESIA EVENT (OUTPATIENT)
Dept: PERIOP | Facility: HOSPITAL | Age: 80
End: 2020-07-14
Payer: MEDICARE

## 2020-07-14 NOTE — TELEPHONE ENCOUNTER
Patient called  Scheduled for surgery tomorrow 7/15/20  [facility] called her for pre admission check in and told her to bring her C/Pap with her to the hospital   Patient questions if she really has to bring it  Advised follow hospital instructions

## 2020-07-15 ENCOUNTER — HOSPITAL ENCOUNTER (OUTPATIENT)
Facility: HOSPITAL | Age: 80
Setting detail: OUTPATIENT SURGERY
Discharge: HOME | End: 2020-07-15
Attending: OBSTETRICS & GYNECOLOGY | Admitting: OBSTETRICS & GYNECOLOGY
Payer: MEDICARE

## 2020-07-15 ENCOUNTER — ANESTHESIA (OUTPATIENT)
Dept: PERIOP | Facility: HOSPITAL | Age: 80
End: 2020-07-15
Payer: MEDICARE

## 2020-07-15 VITALS
TEMPERATURE: 97.5 F | BODY MASS INDEX: 25.55 KG/M2 | WEIGHT: 159 LBS | HEART RATE: 64 BPM | DIASTOLIC BLOOD PRESSURE: 65 MMHG | OXYGEN SATURATION: 97 % | HEIGHT: 66 IN | SYSTOLIC BLOOD PRESSURE: 151 MMHG | RESPIRATION RATE: 16 BRPM

## 2020-07-15 DIAGNOSIS — R93.89 ENDOMETRIAL THICKENING ON ULTRASOUND: ICD-10-CM

## 2020-07-15 PROCEDURE — 58558 HYSTEROSCOPY BIOPSY: CPT | Performed by: OBSTETRICS & GYNECOLOGY

## 2020-07-15 PROCEDURE — 88305 TISSUE EXAM BY PATHOLOGIST: CPT | Performed by: PATHOLOGY

## 2020-07-15 RX ORDER — ONDANSETRON 2 MG/ML
INJECTION INTRAMUSCULAR; INTRAVENOUS AS NEEDED
Status: DISCONTINUED | OUTPATIENT
Start: 2020-07-15 | End: 2020-07-15 | Stop reason: SURG

## 2020-07-15 RX ORDER — PROPOFOL 10 MG/ML
INJECTION, EMULSION INTRAVENOUS AS NEEDED
Status: DISCONTINUED | OUTPATIENT
Start: 2020-07-15 | End: 2020-07-15 | Stop reason: SURG

## 2020-07-15 RX ORDER — FENTANYL CITRATE 50 UG/ML
INJECTION, SOLUTION INTRAMUSCULAR; INTRAVENOUS AS NEEDED
Status: DISCONTINUED | OUTPATIENT
Start: 2020-07-15 | End: 2020-07-15 | Stop reason: SURG

## 2020-07-15 RX ORDER — ACETAMINOPHEN 325 MG/1
650 TABLET ORAL EVERY 6 HOURS PRN
Status: CANCELLED | OUTPATIENT
Start: 2020-07-15

## 2020-07-15 RX ORDER — MAGNESIUM HYDROXIDE 1200 MG/15ML
LIQUID ORAL AS NEEDED
Status: DISCONTINUED | OUTPATIENT
Start: 2020-07-15 | End: 2020-07-15 | Stop reason: HOSPADM

## 2020-07-15 RX ORDER — ACETAMINOPHEN 160 MG/1
160 BAR, CHEWABLE ORAL EVERY 6 HOURS PRN
Status: DISCONTINUED | OUTPATIENT
Start: 2020-07-15 | End: 2020-07-15 | Stop reason: HOSPADM

## 2020-07-15 RX ORDER — ONDANSETRON 2 MG/ML
4 INJECTION INTRAMUSCULAR; INTRAVENOUS ONCE AS NEEDED
Status: DISCONTINUED | OUTPATIENT
Start: 2020-07-15 | End: 2020-07-15 | Stop reason: HOSPADM

## 2020-07-15 RX ORDER — FENTANYL CITRATE/PF 50 MCG/ML
50 SYRINGE (ML) INJECTION
Status: DISCONTINUED | OUTPATIENT
Start: 2020-07-15 | End: 2020-07-15 | Stop reason: HOSPADM

## 2020-07-15 RX ORDER — SODIUM CHLORIDE 9 MG/ML
INJECTION, SOLUTION INTRAVENOUS AS NEEDED
Status: DISCONTINUED | OUTPATIENT
Start: 2020-07-15 | End: 2020-07-15 | Stop reason: HOSPADM

## 2020-07-15 RX ORDER — SODIUM CHLORIDE 9 MG/ML
125 INJECTION, SOLUTION INTRAVENOUS CONTINUOUS
Status: DISCONTINUED | OUTPATIENT
Start: 2020-07-15 | End: 2020-07-15 | Stop reason: HOSPADM

## 2020-07-15 RX ADMIN — FENTANYL CITRATE 50 MCG: 50 INJECTION INTRAMUSCULAR; INTRAVENOUS at 13:29

## 2020-07-15 RX ADMIN — SODIUM CHLORIDE 125 ML/HR: 0.9 INJECTION, SOLUTION INTRAVENOUS at 11:42

## 2020-07-15 RX ADMIN — FENTANYL CITRATE 25 MCG: 50 INJECTION, SOLUTION INTRAMUSCULAR; INTRAVENOUS at 12:41

## 2020-07-15 RX ADMIN — PROPOFOL 80 MG: 10 INJECTION, EMULSION INTRAVENOUS at 12:31

## 2020-07-15 RX ADMIN — SODIUM CHLORIDE: 0.9 INJECTION, SOLUTION INTRAVENOUS at 13:02

## 2020-07-15 RX ADMIN — ONDANSETRON 4 MG: 2 INJECTION INTRAMUSCULAR; INTRAVENOUS at 12:33

## 2020-07-15 RX ADMIN — PROPOFOL 120 MG: 10 INJECTION, EMULSION INTRAVENOUS at 12:30

## 2020-07-15 RX ADMIN — LIDOCAINE HYDROCHLORIDE 80 MG: 20 INJECTION, SOLUTION INTRAVENOUS at 12:30

## 2020-07-15 RX ADMIN — FENTANYL CITRATE 25 MCG: 50 INJECTION, SOLUTION INTRAMUSCULAR; INTRAVENOUS at 12:31

## 2020-07-15 NOTE — OP NOTE
OPERATIVE REPORT  PATIENT NAME: Jen Scott    :  1940  MRN: [id number]  Pt Location: [facility] OR ROOM 07    SURGERY DATE: 7/15/2020    Surgeon(s) and Role:     * Mahsa Montague MD - Primary     * Karen Brody MD - Assisting    Preop Diagnosis:  Endometrial thickening on ultrasound [R93 89]    Post-Op Diagnosis Codes:     * Endometrial thickening on ultrasound [R93 89]    Procedure(s) (LRB):  DILATATION AND CURETTAGE (D&C) WITH HYSTEROSCOPY (N/A)    Specimen(s):  ID Type Source Tests Collected by Time Destination   1 : [facility] [place] [place] Mahsa Montague MD 7/15/2020 1258        Estimated Blood Loss:   20 mL    Drains:  * No LDAs found *    Anesthesia Type:   General    Operative Indications:  Endometrial thickening on ultrasound [R93 89]      Operative Findings:  Very stenotic os, uterus sounded to approximately 6 cm, hysteroscopy was subside is factory due to intervening debris, very little tissue obtained  Complications:   None    Procedure and Technique:  Having identified the patient as Neil Lacrosse, the patient was pre positioned in the dorsal lithotomy position before placing the patient under general anesthesia  The patient was then prepped and draped usual sterile fashion and a time-out was obtained  The patient had a bimanual exam that showed a very atrophic cervix almost flush with the vaginal wall and the bladder was drained for 200 cc of clear yellow urine  After very carefully dilating the cervical os using a lacrimal dilator, very dark liquid started to drain  Progressive dilatation up to a size 16 dilator was then performed  Uterus is sounded to approximately 6 cm  Hysteroscopy was performed but there was intervening debris that precluded an adequate inspection for the hysteroscopy  A very small curette was then introduced into the uterus and careful circumferential curetting yielded very little tissue    All tissue was submitted for surgical pathology and at the conclusion the procedure all sponge instrument counts coincided with the preoperative count       I was present for the entire procedure    Patient Disposition:  PACU     SIGNATURE: Mahsa Montague MD  DATE: July 15, 2020  TIME: 1:15 PM

## 2020-07-15 NOTE — INTERVAL H&P NOTE
H&P reviewed  After examining the patient I find no changes in the patients condition since the H&P had been written      Vitals:    07/15/20 1143   BP: 146/67   Pulse: 61   Resp: 16   Temp: 98 7 °F (37 1 °C)   SpO2: 94%

## 2020-07-15 NOTE — DISCHARGE INSTRUCTIONS
Dilation and Curettage   WHAT YOU SHOULD KNOW:   Dilation and curettage (D and C) is a procedure to remove tissue from the lining of your uterus  AFTER YOU LEAVE:   Medicines:   · Pain medicine: You may be given medicine to take away or decrease pain  Do not wait until the pain is severe before you take your medicine  · Antibiotics: This medicine will help fight or prevent an infection  · Take your medicine as directed  Call your healthcare provider if you think your medicine is not helping or if you have side effects  Tell him if you are allergic to any medicine  Keep a list of the medicines, vitamins, and herbs you take  Include the amounts, and when and why you take them  Bring the list or the pill bottles to follow-up visits  Carry your medicine list with you in case of an emergency  Follow up with your healthcare provider as directed:  Write down your questions so you remember to ask them during your visits  Activity:  Ask your primary healthcare provider when you can return to your normal activities  Contact your primary healthcare provider if:   · You have foul-smelling vaginal discharge  · You do not get your monthly period  · You feel depressed or anxious  · You feel very tired and weak  · You have questions or concerns about your condition or care  Seek care immediately or call 911 if:   · You have heavy vaginal bleeding that soaks 1 pad in 1 hour for 2 hours in a row  · You have a fever and abdominal cramps  · Your pain does not get better, even after treatment  © 2014 [employer] is for End User's use only and may not be sold, redistributed or otherwise used for commercial purposes  All illustrations and images included in CareNotes® are the copyrighted property of [employer] , [employer]  or [employer]  The above information is an  only  It is not intended as medical advice for individual conditions or treatments   Talk to your doctor, nurse or pharmacist before following any medical regimen to see if it is safe and effective for you

## 2020-07-15 NOTE — ANESTHESIA PREPROCEDURE EVALUATION
Review of Systems/Medical History  Patient summary reviewed  Chart reviewed  No history of anesthetic complications     Cardiovascular  Negative cardio ROS    Pulmonary  Sleep apnea ,        GI/Hepatic    GERD well controlled,        Negative  ROS        Endo/Other  History of thyroid disease , hypothyroidism,      GYN       Hematology  Negative hematology ROS      Musculoskeletal  Back pain , cervical pain and lumbar pain,   Arthritis     Neurology    Visual impairment (dry eye),    Psychology   Anxiety, Depression , being treated for depression,              Physical Exam    Airway    Mallampati score: II  TM Distance: >3 FB  Neck ROM: full     Dental   No notable dental hx     Cardiovascular  Comment: Negative ROS, Rhythm: regular, Rate: normal, Cardiovascular exam normal    Pulmonary  Pulmonary exam normal Breath sounds clear to auscultation,     Other Findings        Anesthesia Plan  ASA Score- 2     Anesthesia Type- general with ASA Monitors  Additional Monitors:   Airway Plan: LMA  Comment: Pt relates that she "was awake with tube in for one surgery and was very upsetting"        Plan Factors-Patient not instructed to abstain from smoking on day of procedure  Patient did not smoke on day of surgery  Induction- intravenous  Postoperative Plan-     Informed Consent- Anesthetic plan and risks discussed with patient and spouse

## 2020-07-15 NOTE — ANESTHESIA POSTPROCEDURE EVALUATION
Post-Op Assessment Note    CV Status:  Stable  Pain Score: 3    Pain management: adequate     Mental Status:  Alert and awake   Hydration Status:  Euvolemic and stable   PONV Controlled:  Controlled   Airway Patency:  Patent   Post Op Vitals Reviewed: Yes      Staff: Anesthesiologist           BP     Temp      Pulse     Resp      SpO2

## 2020-07-21 NOTE — RESULT ENCOUNTER NOTE
Spoke to Hemant argueta today at approximately 9:30 a m  informing her of the D&C findings of uterine cancer  Recommended that she contact a gyn oncologist and the patient desires to go to [facility] since her  had a bad experience previously at [facility]  Tried to encourage her that the gyn oncologist here at [facility] were excellent but the patient would rather go to [facility]    Asked Hemant argueta to call should she have any problems, questions or concerns and wished her the best

## 2020-07-22 ENCOUNTER — TELEPHONE (OUTPATIENT)
Dept: OBGYN CLINIC | Facility: CLINIC | Age: 80
End: 2020-07-22

## (undated) DEVICE — PVC URETHRAL CATHETER: Brand: DOVER

## (undated) DEVICE — 2000CC GUARDIAN II: Brand: GUARDIAN

## (undated) DEVICE — GLOVE PI ULTRA TOUCH SZ.6.5

## (undated) DEVICE — IV EXTENSION TUBING 33 IN

## (undated) DEVICE — ASTOUND STANDARD SURGICAL GOWN, XXL: Brand: CONVERTORS

## (undated) DEVICE — STRL ALLENTOWN HYSTEROSCOPY PK: Brand: CARDINAL HEALTH

## (undated) DEVICE — GLOVE PI ULTRA TOUCH SZ.7.0

## (undated) DEVICE — UNDER BUTTOCKS DRAPE W/FLUID CONTROL POUCH: Brand: CONVERTORS

## (undated) DEVICE — PREMIUM DRY TRAY LF: Brand: MEDLINE INDUSTRIES, INC.

## (undated) DEVICE — GLOVE INDICATOR PI UNDERGLOVE SZ 7 BLUE

## (undated) DEVICE — SCD SEQUENTIAL COMPRESSION COMFORT SLEEVE MEDIUM KNEE LENGTH: Brand: KENDALL SCD

## (undated) DEVICE — GLOVE INDICATOR PI UNDERGLOVE SZ 7.5 BLUE

## (undated) DEVICE — GLOVE PI ULTRA TOUCH SZ.7.5

## (undated) DEVICE — CYSTO TUBING SINGLE IRRIGATION